# Patient Record
Sex: MALE | Race: WHITE | NOT HISPANIC OR LATINO | Employment: FULL TIME | URBAN - METROPOLITAN AREA
[De-identification: names, ages, dates, MRNs, and addresses within clinical notes are randomized per-mention and may not be internally consistent; named-entity substitution may affect disease eponyms.]

---

## 2024-05-23 ENCOUNTER — OFFICE VISIT (OUTPATIENT)
Dept: FAMILY MEDICINE | Facility: CLINIC | Age: 19
End: 2024-05-23
Payer: COMMERCIAL

## 2024-05-23 VITALS
SYSTOLIC BLOOD PRESSURE: 117 MMHG | TEMPERATURE: 98.2 F | WEIGHT: 156 LBS | RESPIRATION RATE: 16 BRPM | DIASTOLIC BLOOD PRESSURE: 65 MMHG | HEART RATE: 58 BPM

## 2024-05-23 DIAGNOSIS — Z48.02 VISIT FOR SUTURE REMOVAL: Primary | ICD-10-CM

## 2024-05-23 PROCEDURE — 99203 OFFICE O/P NEW LOW 30 MIN: CPT | Performed by: NURSE PRACTITIONER

## 2024-05-23 NOTE — PROGRESS NOTES
Assessment & Plan     Visit for suture removal         Right ring fingertip laceration 14 days ago.  Laceration without signs of infection, normal function of the finger.  Suture removal x 4 per medical assistant.  Dressed with bacitracin and Band-Aid.  Wash hands and change Band-Aid at least once a day until bleeding has stopped.          No follow-ups on file.    Lily Gusman CNP  [facility] BREND[facility] Ha is a 18 year old male who presents to clinic today for the following health issues:  Chief Complaint   Patient presents with    Suture Removal      Right Ring Finger     HPI    Sutures done at a outside facility in [place] on the right ring finger.  Got a cut while [place] and [place].  Per chart review, this was done on May 10 in [place], [place].  Suture removal was recommended in 10 days.  Number of sutures was not documented in the note, but patient and significant other here both state that there were 4 sutures placed.    Sensitivity and pain is decreased.  No active bleeding.  No concern with his range of motion.      Review of Systems    See HPI         Objective    /65 (BP Location: Right arm, Patient Position: Sitting, Cuff Size: Adult Regular)   Pulse 58   Temp 98.2  F (36.8  C) (Oral)   Resp 16   Wt 70.8 kg (156 lb)   Physical Exam  Pulmonary:      Effort: Pulmonary effort is normal.   Musculoskeletal:         General: Normal range of motion.   Skin:     Findings: No erythema or rash.      Comments: No sign of wound infection overlying the sutures.    Neurological:      Mental Status: He is alert and oriented to person, place, and time.   Psychiatric:         Mood and Affect: Mood normal.

## 2025-01-25 ENCOUNTER — OFFICE VISIT (OUTPATIENT)
Dept: URGENT CARE | Facility: URGENT CARE | Age: 20
End: 2025-01-25

## 2025-01-25 VITALS
HEART RATE: 71 BPM | DIASTOLIC BLOOD PRESSURE: 65 MMHG | RESPIRATION RATE: 20 BRPM | SYSTOLIC BLOOD PRESSURE: 126 MMHG | OXYGEN SATURATION: 97 % | TEMPERATURE: 96.9 F

## 2025-01-25 DIAGNOSIS — K29.70 VIRAL GASTRITIS: Primary | ICD-10-CM

## 2025-01-25 PROCEDURE — 99214 OFFICE O/P EST MOD 30 MIN: CPT

## 2025-01-25 RX ORDER — LOPERAMIDE HYDROCHLORIDE 2 MG/1
2 TABLET ORAL 4 TIMES DAILY PRN
Qty: 30 TABLET | Refills: 0 | Status: SHIPPED | OUTPATIENT
Start: 2025-01-25

## 2025-01-25 RX ORDER — ONDANSETRON 4 MG/1
4 TABLET, ORALLY DISINTEGRATING ORAL EVERY 8 HOURS PRN
Qty: 12 TABLET | Refills: 0 | Status: SHIPPED | OUTPATIENT
Start: 2025-01-25

## 2025-01-25 NOTE — PROGRESS NOTES
Assessment & Plan     Viral gastritis  Patient here with 1 day history of vomiting and diarrhea.  No hematochezia.  Patient has not had any fevers. This is likely infectious etiology probably viral given the patient is afebrile today with nonbloody diarrhea.  Will offer some loperamide and Zofran to help with the nausea and diarrhea.  Patient agreeable to the plan.  Patient able to take in plenty.  No additional questions or concerns.  - ondansetron (ZOFRAN ODT) 4 MG ODT tab  Dispense: 12 tablet; Refill: 0  - loperamide (IMODIUM A-D) 2 MG tablet  Dispense: 30 tablet; Refill: 0     No follow-ups on file.    Jesse Davis MD  [facility] Ha is a 19 year old male who presents to clinic today for the following health issues:  Chief Complaint   Patient presents with    Diarrhea     Loose and watery stools x Friday. Vomited yesterday twice but none today. No blood in stools. No fever.      HPI    Chief complaint of acute onset diarrhea that started on Friday afternoon. The patient experienced diarrhea once on Friday afternoon and once on Friday night, with ongoing symptoms since then. He denies hematochezia. The patient reports intermittent nausea but no emesis since Friday night.    The patient denies awareness of fever but has not checked his temperature. He reports abdominal pain after eating and has reduced his food intake. Diarrhea occurs almost immediately after eating. The patient's sister had similar symptoms, including vomiting, a couple of days prior. The patient is able to tolerate oral fluids.    The patient is employed, though the specific occupation is not specified. He lives with his sister.    Review of Systems reveals ongoing diarrhea, no blood in stool, nausea, and abdominal pain after eating. The patient reports no fever.      Objective    /65   Pulse 71   Temp 96.9  F (36.1  C) (Tympanic)   Resp 20   SpO2 97%   Physical Exam   GENERAL:  Awake, alert, No acute distress.   HEENT: No scleral icterus or conjunctival injection.  SKIN: Warm and dry. No bruises, rashes, or skin lesions.  LUNGS: Normal work of breathing with no use of accessory muscles. Clear breath sounds in all lung fields bilaterally with no wheezes or crackles appreciated.  CARDIAC: RRR. Normal S1 and S2. No murmurs, clicks, or rubs appreciated. No peripheral edema.  ABDOMEN: Non-distended. Soft, slightly tender along the right side of his abdomen.   NEUROLOGIC: Alert and oriented. Sensation to light touch involving upper and lower extremities intact bilaterally.   EXTREMITIES: No gross deformity or peripheral edema. Appear well-perfused.

## 2025-01-25 NOTE — LETTER
2025    Leland Balderas   2005        To Whom it May Concern;    Please excuse Leland Balderas from work for a healthcare visit on 2025. Patient is recovering from an illness and can return in 2-3 days once symptoms have resolved.     Sincerely,    Jesse Davis MD